# Patient Record
Sex: FEMALE | Race: BLACK OR AFRICAN AMERICAN
[De-identification: names, ages, dates, MRNs, and addresses within clinical notes are randomized per-mention and may not be internally consistent; named-entity substitution may affect disease eponyms.]

---

## 2018-08-14 ENCOUNTER — HOSPITAL ENCOUNTER (EMERGENCY)
Dept: HOSPITAL 61 - ER | Age: 33
Discharge: HOME | End: 2018-08-14
Payer: MEDICAID

## 2018-08-14 VITALS — SYSTOLIC BLOOD PRESSURE: 110 MMHG | DIASTOLIC BLOOD PRESSURE: 57 MMHG

## 2018-08-14 VITALS — BODY MASS INDEX: 24.17 KG/M2 | WEIGHT: 154 LBS | HEIGHT: 67 IN

## 2018-08-14 DIAGNOSIS — Z91.040: ICD-10-CM
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
## 2018-08-14 DIAGNOSIS — Z88.6: ICD-10-CM
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
## 2018-08-14 DIAGNOSIS — E10.9: ICD-10-CM

## 2018-08-14 DIAGNOSIS — T65.811A: Primary | ICD-10-CM

## 2018-08-14 DIAGNOSIS — Z88.5: ICD-10-CM

## 2018-08-14 PROCEDURE — 99281 EMR DPT VST MAYX REQ PHY/QHP: CPT

## 2018-08-14 NOTE — PHYS DOC
Past Medical History


Past Medical History:  Diabetes-Type I, Other


Additional Past Medical Histor:  PWP,


Past Surgical History:  Other


Additional Past Surgical Histo:  D&C,CARDIAC OBLATION


Alcohol Use:  None


Drug Use:  None





Adult General


Chief Complaint


Chief Complaint:  ALLERGIC REACTION





HPI


HPI





Patient is a 33  year old female who presents to the emergency Department today 

with complaints of throat itching, and some chest tightness that occurred after 

eating [employer] this afternoon. She states she is allergic to latex and that 

she called the restaurant after the symptoms developed and asked them if they 

used latex gloves to prepare food. Patient states that the restaurant employee 

told her that latex gloves are used to prepare the food. Patient states she 

then took a Benadryl, 25 mg tablet at approximately 1330. At this time she 

denies any difficulty swallowing, throat itching, shortness breath, or chest 

tightness. She states she feels much better and denies any complaints at this 

time. Patient also denies any pain at this time





Review of Systems


Review of Systems





Constitutional: Denies fever or chills []


HENT: Denies nasal congestion , throat itching, throat swelling, difficulty 

breathing, or sore throat []


Respiratory: Denies cough, wheezing, or shortness of breath []


Cardiovascular: Denies chest pain


Integument: Denies rash or skin lesions []


Neurologic: Denies headache, focal weakness or sensory changes []





All other systems were reviewed and found to be within normal limits, except as 

documented in this note.





Allergies


Allergies





Allergies








Coded Allergies Type Severity Reaction Last Updated Verified


 


  azithromycin Allergy Severe HIVES 8/14/18 Yes


 


  latex Allergy Severe FACIAL; SWELLING 8/14/18 Yes


 


  hydrocodone Allergy Intermediate VOMITING 8/14/18 Yes











Physical Exam


Physical Exam





Constitutional: Well developed, well nourished, no acute distress, non-toxic 

appearance. []


HENT: Normocephalic, atraumatic, bilateral external ears normal, oropharynx 

moist, no oral exudates, no erythema of posterior pharynx, airway is open, nose 

normal. []


Eyes: PERRLA,conjunctiva normal, no discharge. [] 


Neck: no tenderness, supple, no stridor. [] 


Cardiovascular:Heart rate regular rhythm, no murmur []


Lungs & Thorax:  Bilateral breath sounds clear to auscultation []


Skin: Warm, dry, no erythema, no rash. [] 


Extremities: No tenderness, no cyanosis,  no edema. [] 


Neurologic: Alert and oriented X 3, normal motor function, normal sensory 

function, no focal deficits noted. []


Psychologic: Affect normal, judgement normal, mood normal. []





Current Patient Data


Vital Signs





 Vital Signs








  Date Time  Temp Pulse Resp B/P (MAP) Pulse Ox O2 Delivery O2 Flow Rate FiO2


 


8/14/18 14:15 98.4 83 18 110/57 (74) 99 Room Air  





 98.4       











EKG


EKG


[]





Radiology/Procedures


Radiology/Procedures


[]





Course & Med Decision Making


Course & Med Decision Making


Pertinent Labs and Imaging studies reviewed. (See chart for details)


Patient is a 33-year-old female who presented to the emergency room with 

reports of a possible allergic reaction to latex labs that was used to prepare 

food at [employer].


VSS, physical exam is negative for any acute findings or any concerns of 

anaphylaxis. Patient's reports feeling better after taking a Benadryl prior to 

arrival at the emergency room. Clinically she presents as an allergic reaction 

to a food allergy. She was treated as such. Patient was instructed to continue 

taking Benadryl every 6 hours as needed. Follow-up with her primary care doctor 

in the next 1-2 days. Return to the emergency room if her symptoms worsen.


[]





Dragon Disclaimer


Dragon Disclaimer


This electronic medical record was generated, in whole or in part, using a 

voice recognition dictation system.





Departure


Departure


Impression:  


 Primary Impression:  


 Allergic reaction to latex


Patient Instructions:  Latex Allergy





Additional Instructions:  


Continue taking oral Benadryl every 6 hours as needed. Follow-up with your 

primary care doctor in the next 1-2 days. Return to the ER for symptoms worsen.





Problem Qualifiers








 Primary Impression:  


 Allergic reaction to latex


 Encounter type:  initial encounter  Injury intent:  accidental or 

unintentional  Qualified Codes:  T65.811A - Toxic effect of latex, accidental (

unintentional), initial encounter








DEANNA HILL APRN Aug 14, 2018 14:41